# Patient Record
Sex: FEMALE | Race: AMERICAN INDIAN OR ALASKA NATIVE | Employment: OTHER | URBAN - METROPOLITAN AREA
[De-identification: names, ages, dates, MRNs, and addresses within clinical notes are randomized per-mention and may not be internally consistent; named-entity substitution may affect disease eponyms.]

---

## 2021-04-27 ENCOUNTER — APPOINTMENT (OUTPATIENT)
Dept: ULTRASOUND IMAGING | Age: 71
End: 2021-04-27
Attending: EMERGENCY MEDICINE
Payer: MEDICARE

## 2021-04-27 ENCOUNTER — APPOINTMENT (OUTPATIENT)
Dept: GENERAL RADIOLOGY | Age: 71
End: 2021-04-27
Attending: EMERGENCY MEDICINE
Payer: MEDICARE

## 2021-04-27 ENCOUNTER — HOSPITAL ENCOUNTER (OUTPATIENT)
Facility: HOSPITAL | Age: 71
Setting detail: OBSERVATION
Discharge: HOME | End: 2021-04-29
Attending: EMERGENCY MEDICINE | Admitting: INTERNAL MEDICINE
Payer: MEDICARE

## 2021-04-27 DIAGNOSIS — I48.91 ATRIAL FIBRILLATION WITH RVR (HCC): Primary | ICD-10-CM

## 2021-04-27 PROCEDURE — 85730 THROMBOPLASTIN TIME PARTIAL: CPT | Performed by: EMERGENCY MEDICINE

## 2021-04-27 PROCEDURE — 96366 THER/PROPH/DIAG IV INF ADDON: CPT

## 2021-04-27 PROCEDURE — 99291 CRITICAL CARE FIRST HOUR: CPT

## 2021-04-27 PROCEDURE — 85610 PROTHROMBIN TIME: CPT | Performed by: EMERGENCY MEDICINE

## 2021-04-27 PROCEDURE — 80053 COMPREHEN METABOLIC PANEL: CPT | Performed by: EMERGENCY MEDICINE

## 2021-04-27 PROCEDURE — 71045 X-RAY EXAM CHEST 1 VIEW: CPT | Performed by: EMERGENCY MEDICINE

## 2021-04-27 PROCEDURE — 83880 ASSAY OF NATRIURETIC PEPTIDE: CPT | Performed by: EMERGENCY MEDICINE

## 2021-04-27 PROCEDURE — 93970 EXTREMITY STUDY: CPT | Performed by: EMERGENCY MEDICINE

## 2021-04-27 PROCEDURE — 96368 THER/DIAG CONCURRENT INF: CPT

## 2021-04-27 PROCEDURE — 96365 THER/PROPH/DIAG IV INF INIT: CPT

## 2021-04-27 PROCEDURE — 85379 FIBRIN DEGRADATION QUANT: CPT | Performed by: EMERGENCY MEDICINE

## 2021-04-27 PROCEDURE — 84484 ASSAY OF TROPONIN QUANT: CPT | Performed by: EMERGENCY MEDICINE

## 2021-04-27 PROCEDURE — 93010 ELECTROCARDIOGRAM REPORT: CPT | Performed by: INTERNAL MEDICINE

## 2021-04-27 PROCEDURE — 93005 ELECTROCARDIOGRAM TRACING: CPT

## 2021-04-27 PROCEDURE — 93010 ELECTROCARDIOGRAM REPORT: CPT

## 2021-04-27 PROCEDURE — 85025 COMPLETE CBC W/AUTO DIFF WBC: CPT | Performed by: EMERGENCY MEDICINE

## 2021-04-27 PROCEDURE — 96375 TX/PRO/DX INJ NEW DRUG ADDON: CPT

## 2021-04-27 RX ORDER — HEPARIN SODIUM AND DEXTROSE 10000; 5 [USP'U]/100ML; G/100ML
INJECTION INTRAVENOUS CONTINUOUS
Status: DISCONTINUED | OUTPATIENT
Start: 2021-04-28 | End: 2021-04-29

## 2021-04-27 RX ORDER — IBUPROFEN 800 MG/1
800 TABLET ORAL EVERY 6 HOURS PRN
Status: ON HOLD | COMMUNITY
End: 2021-04-29

## 2021-04-27 RX ORDER — ONDANSETRON 2 MG/ML
4 INJECTION INTRAMUSCULAR; INTRAVENOUS EVERY 4 HOURS PRN
Status: ACTIVE | OUTPATIENT
Start: 2021-04-27 | End: 2021-04-28

## 2021-04-27 RX ORDER — HEPARIN SODIUM 5000 [USP'U]/ML
5000 INJECTION INTRAVENOUS; SUBCUTANEOUS ONCE
Status: COMPLETED | OUTPATIENT
Start: 2021-04-27 | End: 2021-04-27

## 2021-04-27 RX ORDER — HEPARIN SODIUM AND DEXTROSE 10000; 5 [USP'U]/100ML; G/100ML
1000 INJECTION INTRAVENOUS ONCE
Status: COMPLETED | OUTPATIENT
Start: 2021-04-27 | End: 2021-04-28

## 2021-04-27 RX ORDER — FUROSEMIDE 10 MG/ML
40 INJECTION INTRAMUSCULAR; INTRAVENOUS ONCE
Status: COMPLETED | OUTPATIENT
Start: 2021-04-27 | End: 2021-04-27

## 2021-04-27 NOTE — ED PROVIDER NOTES
Patient Seen in: [facility] Emergency Department In [place]      History   Patient presents with:  Swelling Edema    Stated Complaint: bilateral feet swelling  concern of blood clot after vaccine     HPI/Subjective:   HPI    79-year-old female with past m Movements: Extraocular movements intact. Pupils: Pupils are equal, round, and reactive to light. Cardiovascular:      Rate and Rhythm: Tachycardia present. Rhythm irregular.    Pulmonary:      Effort: Pulmonary effort is normal.      Breath sounds: N RAINBOW DRAW LIGHT GREEN   RAINBOW DRAW GOLD   CBC W/ DIFFERENTIAL     EKG    Rate, intervals and axes as noted on EKG Report.   Rate: 120  Rhythm: Atrial Fibrillation  Reading: Rapid ventricular response, no STEMI              US VENOUS DOPPLER LEG BILAT pulmonary fibrosis. FINDINGS:  Cardiac silhouette is unremarkable. Pulmonary vasculature is unremarkable. There is a small right infrahilar consolidation suggested. CONCLUSION:  Small right infrahilar consolidation.     Dictated by (CST): Erica Disposition and Plan     Clinical Impression:  Atrial fibrillation with RVR ([facility] [address]  (primary encounter diagnosis)     Disposition:  Admit  4/27/2021  8:55 pm    Follow-up:  No follow-up provider specified.         Medications Presc

## 2021-04-28 ENCOUNTER — APPOINTMENT (OUTPATIENT)
Dept: CV DIAGNOSTICS | Facility: HOSPITAL | Age: 71
End: 2021-04-28
Attending: HOSPITALIST
Payer: MEDICARE

## 2021-04-28 PROCEDURE — 83735 ASSAY OF MAGNESIUM: CPT | Performed by: HOSPITALIST

## 2021-04-28 PROCEDURE — 85025 COMPLETE CBC W/AUTO DIFF WBC: CPT | Performed by: HOSPITALIST

## 2021-04-28 PROCEDURE — 36415 COLL VENOUS BLD VENIPUNCTURE: CPT

## 2021-04-28 PROCEDURE — B246ZZZ ULTRASONOGRAPHY OF RIGHT AND LEFT HEART: ICD-10-PCS | Performed by: INTERNAL MEDICINE

## 2021-04-28 PROCEDURE — 84443 ASSAY THYROID STIM HORMONE: CPT | Performed by: HOSPITALIST

## 2021-04-28 PROCEDURE — 93306 TTE W/DOPPLER COMPLETE: CPT | Performed by: HOSPITALIST

## 2021-04-28 PROCEDURE — 85730 THROMBOPLASTIN TIME PARTIAL: CPT | Performed by: INTERNAL MEDICINE

## 2021-04-28 PROCEDURE — 96366 THER/PROPH/DIAG IV INF ADDON: CPT

## 2021-04-28 PROCEDURE — 80048 BASIC METABOLIC PNL TOTAL CA: CPT | Performed by: HOSPITALIST

## 2021-04-28 PROCEDURE — 93005 ELECTROCARDIOGRAM TRACING: CPT

## 2021-04-28 PROCEDURE — 85730 THROMBOPLASTIN TIME PARTIAL: CPT | Performed by: EMERGENCY MEDICINE

## 2021-04-28 PROCEDURE — 93010 ELECTROCARDIOGRAM REPORT: CPT | Performed by: INTERNAL MEDICINE

## 2021-04-28 RX ORDER — FUROSEMIDE 10 MG/ML
40 INJECTION INTRAMUSCULAR; INTRAVENOUS ONCE
Status: COMPLETED | OUTPATIENT
Start: 2021-04-29 | End: 2021-04-29

## 2021-04-28 RX ORDER — POTASSIUM CHLORIDE 20 MEQ/1
40 TABLET, EXTENDED RELEASE ORAL ONCE
Status: COMPLETED | OUTPATIENT
Start: 2021-04-28 | End: 2021-04-28

## 2021-04-28 RX ORDER — LEVOTHYROXINE SODIUM 0.07 MG/1
75 TABLET ORAL
Status: DISCONTINUED | OUTPATIENT
Start: 2021-04-28 | End: 2021-04-29

## 2021-04-28 NOTE — H&P
[facility] History and Physical      CC: SOB., LE edema    PCP: Kehinde Lr MD    History of Present Illness: Patient is a 73year old female with PMH sig for pulm fibrosis here with LE edema. She reports that she has had SOB for may months.  She n infusion 1,000 Units/hr (04/28/21 0115)     PRN:     Allergies:    Clindamycin             RASH  Pcn [Bicillin C-R,]       Tetracycline Hcl             Past Social Hx:  Denies heavy ETOH use  Denies illicts    Fam Hx  Family History   Problem Relation Age Ddimer neg  - ECHO pending  - Tele    # LE edema: resolved on my exam today, doppler neg for DVT, suspect some volume overload related to possibly her Afib  - Agree with another dose of lasix today    # Hypothyroidism: synthroid    # SOB: She has reported

## 2021-04-28 NOTE — CONSULTS
[facility]  Consultation Note      Alonzo Red Patient Status:  Observation    1950 MRN [id number]   [facility] 2NE-A Attending Ruma Pugh MD   Hosp Day # 0 PCP Zena Moran MD     Reason for consu add-vantage, 5 mg/hr, Intravenous, Continuous  Levothyroxine Sodium tab 75 mcg, 75 mcg, Oral, Before breakfast  heparin (PORCINE) drip [id number]/250mL infusion CONTINUOUS, 200-3,000 Units/hr, Intravenous, Continuous        Past Medical History:   Elias Snyder intact; sclerae are anicteric; scalp is atrauamatic; no thyromegaly  Neck: Supple; no JVD; no carotid bruits  Cardiac: irregularly irregular; no murmurs/rubs/gallops are appreciated; PMI is non-displaced; there is no evidence of a sternal heave  Lungs: Earnest

## 2021-04-28 NOTE — PROGRESS NOTES
04/27/21 2239 04/27/21 2240 04/27/21 2243   Vital Signs   Pulse 87 91 106   Heart Rate Source Monitor  --   --    Resp 18  --   --    Respiratory Quality Normal  --   --    /63 104/65 122/79   MAP (mmHg) 77 74 89   BP Location Right arm Right arm

## 2021-04-28 NOTE — PLAN OF CARE
Pt is A&Ox4. Very talkative. Hesitant to state she's been having SOB, palpitations and angina for years, hasn't been able to [facility] without stopping d/t SOB\". RA, lungs clear. Afib on tele, HR 90s on cardizem gtt & heparin gtt. Continent B&B.

## 2021-04-28 NOTE — PLAN OF CARE
Denies c/o pain, malaise, or cardiac symptoms. Tolerating all medications well with no adverse effects. IV heparin and Cardizem infusing through different lines. On my assessment I see no edema.   Remains in AFIB, HRs now mostly controlled in the 80s-90s rate control medications as ordered  - Initiate emergency measures for life threatening arrhythmias  - Monitor electrolytes and administer replacement therapy as ordered  Outcome: Progressing

## 2021-04-29 ENCOUNTER — APPOINTMENT (OUTPATIENT)
Dept: GENERAL RADIOLOGY | Facility: HOSPITAL | Age: 71
End: 2021-04-29
Attending: HOSPITALIST
Payer: MEDICARE

## 2021-04-29 VITALS
BODY MASS INDEX: 36 KG/M2 | DIASTOLIC BLOOD PRESSURE: 66 MMHG | OXYGEN SATURATION: 98 % | HEART RATE: 88 BPM | RESPIRATION RATE: 18 BRPM | TEMPERATURE: 99 F | SYSTOLIC BLOOD PRESSURE: 126 MMHG | WEIGHT: 221.31 LBS

## 2021-04-29 PROCEDURE — 96366 THER/PROPH/DIAG IV INF ADDON: CPT

## 2021-04-29 PROCEDURE — 96376 TX/PRO/DX INJ SAME DRUG ADON: CPT

## 2021-04-29 PROCEDURE — 84132 ASSAY OF SERUM POTASSIUM: CPT | Performed by: INTERNAL MEDICINE

## 2021-04-29 PROCEDURE — 71045 X-RAY EXAM CHEST 1 VIEW: CPT | Performed by: HOSPITALIST

## 2021-04-29 PROCEDURE — 85730 THROMBOPLASTIN TIME PARTIAL: CPT | Performed by: HOSPITALIST

## 2021-04-29 RX ORDER — ACETAMINOPHEN 325 MG/1
650 TABLET ORAL EVERY 6 HOURS PRN
Status: DISCONTINUED | OUTPATIENT
Start: 2021-04-29 | End: 2021-04-29

## 2021-04-29 RX ORDER — DILTIAZEM HYDROCHLORIDE 120 MG/1
120 CAPSULE, EXTENDED RELEASE ORAL DAILY
Status: DISCONTINUED | OUTPATIENT
Start: 2021-04-29 | End: 2021-04-29

## 2021-04-29 RX ORDER — DILTIAZEM HYDROCHLORIDE 240 MG/1
240 CAPSULE, COATED, EXTENDED RELEASE ORAL DAILY
Qty: 30 CAPSULE | Refills: 11 | Status: SHIPPED | OUTPATIENT
Start: 2021-04-29 | End: 2021-05-11

## 2021-04-29 NOTE — PLAN OF CARE
Received pt at 1930. Alert and oriented. Afib on tele, rates controlled 70-85 tonight. Cardizem gtt infusing at 5mL/hr and heparin gtt at 10 mL/hr. No reports of chest pain or cardiac symptoms tonight. Adequately saturating on room air.   Plan for repe

## 2021-04-29 NOTE — DISCHARGE SUMMARY
General Medicine Discharge Summary     Patient ID:  Enrique Bond  73year old  4/8/1950    Admit date: 4/27/2021    Discharge date and time:  4/29/21    Attending Physician: Darwin Sandhu MD Per cards- cardizem, heparin  - TSH OK  - Ddimer neg  - ECHO looks OK  - Doing well today- home on PO dilt and Xarelto per Cards       # LE edema: resolved on my exam today, doppler neg for DVT, suspect some volume overload related to possibly her Afib  - wheezing  Abdomen: nontender, nondistended, intact BS  Extremities: no pedal edema   Neuro: CN inact, no focal deficits      Total time coordinating care for discharge: 45 Minutes    MD Henry Yusuf MD  [facility] Hospitalist  Pager:

## 2021-04-29 NOTE — PROGRESS NOTES
[facility]  Progress Note    Marga Sanabria Patient Status:  Observation    1950 MRN [id number]   [facility] 2NE-A Attending Pratibha Melendez MD   Hosp Day # 0 PCP Jeny Mantilla MD     Reason for consultatio Lab Results   Component Value Date    K 4.4 04/29/2021       Telemetry: No malignant tachyarrhythmias or bradyarrhythmias      Thank you for allowing our practice to participate in the care of your patient.  Please do not hesitate to contact me if you h

## 2021-04-29 NOTE — PROGRESS NOTES
04/29/21 1044   Clinical Encounter Type   Visited With Patient   Patient's Supportive Strategies/Resources Identified Ivania's family support that incl. her son and her daughter. Explored her lizbet, values and pracrices. respected and supported.    Relig

## 2021-04-29 NOTE — PLAN OF CARE
Problem: Patient/Family Goals  Discharge planning in progress  Home today, Social work arranged transportation for patient for [phone].   Goal: Patient/Family Long Term Goal  Description: Patient's Long Term Goal: \"discharge\"    Interventions:  - monitor t replacement therapy as ordered  Outcome: Progressing

## 2021-04-29 NOTE — CM/SW NOTE
Script for xarelto sent to [facility] with insurance $[id number]. oo--qualifies for 30-day free card, notified [place] [employer] regarding cost; await plan    Brenda rosales spoke with dr Jeannie Infante to proceed with the 30 day free xarelto script and they will address outp

## 2021-04-29 NOTE — PLAN OF CARE
Discharge Instructions reviewed with patient. Home medications discussed. Medications delivered to bedside from [facility].   Follow up appointments discussed

## 2021-04-29 NOTE — CM/SW NOTE
04/29/21 1500   CM/SW Referral Data   Referral Source Physician   Reason for Referral Discharge planning;Financial issues   Informant Patient   Patient Info   Patient's Mental Status Alert;Oriented   Patient's [address]   Patient lives with Pt did confirm that she does not have Medicaid. SW informed pt of the out of pocket cost associated with the transport, and that the bill will be sent to the pt's home address. Pt in agreement to the cost of the medicar.  Pt was requesting to get dropped of

## 2021-05-10 ENCOUNTER — LAB ENCOUNTER (OUTPATIENT)
Dept: LAB | Age: 71
End: 2021-05-10
Attending: INTERNAL MEDICINE
Payer: MEDICARE

## 2021-05-10 DIAGNOSIS — I48.0 PAROXYSMAL ATRIAL FIBRILLATION (HCC): ICD-10-CM

## 2021-05-10 PROCEDURE — 36415 COLL VENOUS BLD VENIPUNCTURE: CPT

## 2021-05-10 PROCEDURE — 80048 BASIC METABOLIC PNL TOTAL CA: CPT

## 2021-05-21 ENCOUNTER — OFF PREMISE (OUTPATIENT)
Dept: CARDIOLOGY | Age: 71
End: 2021-05-21

## 2021-06-07 ENCOUNTER — TELEPHONE (OUTPATIENT)
Dept: CARDIOLOGY | Age: 71
End: 2021-06-07

## 2021-06-07 DIAGNOSIS — R06.00 DYSPNEA, UNSPECIFIED TYPE: Primary | ICD-10-CM

## (undated) NOTE — IP AVS SNAPSHOT
[address]            (For Outpatient Use Only) Initial Admit Date: 4/27/2021   Inpt/Obs Admit Date: Inpt: N/A / Obs: 04/27/21   Discharge Date:    Lucio Diana:  [de-identified]   MRN: [de-identified]   CSN: [id number]   CEID: [id number] Subscriber Name:  Gina Jain :    Subscriber ID:  Pt Rel to Subscriber:    Hospital Account Financial Class: Medicare    2021

## (undated) NOTE — LETTER
May 10, 2021    Johanny Moreno [id number]      Dear Bonilla Zhu: The following are the results of your recent tests ordered by Dylan Wheat. Please review the list of test results.   Your result is the number on t